# Patient Record
Sex: MALE | Race: WHITE | NOT HISPANIC OR LATINO | URBAN - METROPOLITAN AREA
[De-identification: names, ages, dates, MRNs, and addresses within clinical notes are randomized per-mention and may not be internally consistent; named-entity substitution may affect disease eponyms.]

---

## 2018-08-29 ENCOUNTER — OFFICE (OUTPATIENT)
Dept: URBAN - METROPOLITAN AREA CLINIC 19 | Facility: CLINIC | Age: 58
End: 2018-08-29

## 2018-08-29 VITALS
DIASTOLIC BLOOD PRESSURE: 72 MMHG | HEIGHT: 73 IN | HEART RATE: 67 BPM | SYSTOLIC BLOOD PRESSURE: 132 MMHG | WEIGHT: 213 LBS

## 2018-08-29 DIAGNOSIS — R15.2 FECAL URGENCY: ICD-10-CM

## 2018-08-29 DIAGNOSIS — M79.7 FIBROMYALGIA: ICD-10-CM

## 2018-08-29 DIAGNOSIS — K58.9 IRRITABLE BOWEL SYNDROME WITHOUT DIARRHEA: ICD-10-CM

## 2018-08-29 DIAGNOSIS — K21.9 GASTRO-ESOPHAGEAL REFLUX DISEASE WITHOUT ESOPHAGITIS: ICD-10-CM

## 2018-08-29 LAB — TSH: 1.08 UIU/ML (ref 0.45–4.5)

## 2018-08-29 PROCEDURE — 99214 OFFICE O/P EST MOD 30 MIN: CPT | Performed by: INTERNAL MEDICINE

## 2018-08-29 RX ORDER — DEXLANSOPRAZOLE 60 MG/1
60 CAPSULE, DELAYED RELEASE ORAL
Qty: 30 | Refills: 3 | Status: COMPLETED
End: 2018-08-29

## 2018-08-29 RX ORDER — PANTOPRAZOLE SODIUM 40 MG/1
40 TABLET, DELAYED RELEASE ORAL
Qty: 30 | Refills: 11 | Status: COMPLETED
Start: 2018-08-29 | End: 2018-02-12

## 2018-11-14 ENCOUNTER — OFFICE (OUTPATIENT)
Dept: URBAN - METROPOLITAN AREA CLINIC 19 | Facility: CLINIC | Age: 58
End: 2018-11-14

## 2018-11-14 VITALS
HEART RATE: 66 BPM | SYSTOLIC BLOOD PRESSURE: 141 MMHG | HEIGHT: 73 IN | WEIGHT: 211 LBS | DIASTOLIC BLOOD PRESSURE: 78 MMHG

## 2018-11-14 DIAGNOSIS — R15.2 FECAL URGENCY: ICD-10-CM

## 2018-11-14 DIAGNOSIS — K21.9 GASTRO-ESOPHAGEAL REFLUX DISEASE WITHOUT ESOPHAGITIS: ICD-10-CM

## 2018-11-14 DIAGNOSIS — M79.7 FIBROMYALGIA: ICD-10-CM

## 2018-11-14 DIAGNOSIS — K58.9 IRRITABLE BOWEL SYNDROME WITHOUT DIARRHEA: ICD-10-CM

## 2018-11-14 PROCEDURE — 99213 OFFICE O/P EST LOW 20 MIN: CPT | Performed by: INTERNAL MEDICINE

## 2019-05-15 ENCOUNTER — OFFICE (OUTPATIENT)
Dept: URBAN - METROPOLITAN AREA CLINIC 19 | Facility: CLINIC | Age: 59
End: 2019-05-15

## 2019-05-15 VITALS
SYSTOLIC BLOOD PRESSURE: 115 MMHG | WEIGHT: 200 LBS | HEART RATE: 59 BPM | DIASTOLIC BLOOD PRESSURE: 66 MMHG | HEIGHT: 73 IN

## 2019-05-15 DIAGNOSIS — K21.9 GASTRO-ESOPHAGEAL REFLUX DISEASE WITHOUT ESOPHAGITIS: ICD-10-CM

## 2019-05-15 DIAGNOSIS — K58.0 IRRITABLE BOWEL SYNDROME WITH DIARRHEA: ICD-10-CM

## 2019-05-15 DIAGNOSIS — R15.2 FECAL URGENCY: ICD-10-CM

## 2019-05-15 PROCEDURE — 99213 OFFICE O/P EST LOW 20 MIN: CPT | Performed by: INTERNAL MEDICINE

## 2019-05-15 RX ORDER — RIFAXIMIN 550 MG/1
TABLET ORAL
Qty: 42 | Refills: 0 | Status: COMPLETED
Start: 2019-05-15 | End: 2019-10-30

## 2019-10-30 ENCOUNTER — OFFICE (OUTPATIENT)
Dept: URBAN - METROPOLITAN AREA CLINIC 19 | Facility: CLINIC | Age: 59
End: 2019-10-30

## 2019-10-30 VITALS
DIASTOLIC BLOOD PRESSURE: 78 MMHG | HEART RATE: 73 BPM | WEIGHT: 194 LBS | SYSTOLIC BLOOD PRESSURE: 128 MMHG | HEIGHT: 73 IN

## 2019-10-30 DIAGNOSIS — R10.12 LEFT UPPER QUADRANT PAIN: ICD-10-CM

## 2019-10-30 DIAGNOSIS — K21.9 GASTRO-ESOPHAGEAL REFLUX DISEASE WITHOUT ESOPHAGITIS: ICD-10-CM

## 2019-10-30 PROCEDURE — 99214 OFFICE O/P EST MOD 30 MIN: CPT | Performed by: INTERNAL MEDICINE

## 2023-03-08 ENCOUNTER — OFFICE (OUTPATIENT)
Dept: URBAN - METROPOLITAN AREA CLINIC 19 | Facility: CLINIC | Age: 63
End: 2023-03-08

## 2023-03-08 VITALS
OXYGEN SATURATION: 99 % | SYSTOLIC BLOOD PRESSURE: 124 MMHG | HEART RATE: 59 BPM | DIASTOLIC BLOOD PRESSURE: 74 MMHG | HEIGHT: 73 IN | WEIGHT: 185 LBS

## 2023-03-08 DIAGNOSIS — K21.9 GASTRO-ESOPHAGEAL REFLUX DISEASE WITHOUT ESOPHAGITIS: ICD-10-CM

## 2023-03-08 DIAGNOSIS — K58.0 IRRITABLE BOWEL SYNDROME WITH DIARRHEA: ICD-10-CM

## 2023-03-08 PROCEDURE — 99214 OFFICE O/P EST MOD 30 MIN: CPT | Performed by: INTERNAL MEDICINE

## 2023-03-08 RX ORDER — FAMOTIDINE 40 MG/1
40 TABLET, FILM COATED ORAL
Qty: 30 | Refills: 11 | Status: COMPLETED
Start: 2023-03-08 | End: 2024-01-08

## 2023-11-07 ENCOUNTER — OFFICE (OUTPATIENT)
Dept: URBAN - METROPOLITAN AREA CLINIC 19 | Facility: CLINIC | Age: 63
End: 2023-11-07

## 2023-11-07 VITALS
HEART RATE: 63 BPM | SYSTOLIC BLOOD PRESSURE: 116 MMHG | DIASTOLIC BLOOD PRESSURE: 63 MMHG | OXYGEN SATURATION: 98 % | HEIGHT: 73 IN | WEIGHT: 185 LBS

## 2023-11-07 DIAGNOSIS — M79.7 FIBROMYALGIA: ICD-10-CM

## 2023-11-07 DIAGNOSIS — K21.9 GASTRO-ESOPHAGEAL REFLUX DISEASE WITHOUT ESOPHAGITIS: ICD-10-CM

## 2023-11-07 DIAGNOSIS — R19.7 DIARRHEA, UNSPECIFIED: ICD-10-CM

## 2023-11-07 PROCEDURE — 99214 OFFICE O/P EST MOD 30 MIN: CPT | Performed by: NURSE PRACTITIONER

## 2023-11-07 RX ORDER — BENZOYL PEROXIDE 135 MG/G
GEL TOPICAL
Qty: 60 | Refills: 11 | Status: ACTIVE
Start: 2023-11-07

## 2023-11-07 NOTE — SERVICENOTES
Discussed possible IBS-D. . Recent labs ok in August 2023 including CMP + TSH. Sent to Dr. Madedn for review. Dalia does not have diarrhea listed as possible side effect.

## 2023-11-07 NOTE — SERVICEHPINOTES
PREVIOUS HISTORY BY DR. MITTAL:marsha larson Mr. Burroughs is a 62 year old male with fibromyalgia and initially established for screening colonoscopy in 2016. He is a  for [employer]. More recently managed for IBS,  reflux and abdominal cramping. The workup including an IBcause test was normal.  symptoms actually seemed to improve with PPI therapy  and knowledge of low FODMAP  dietUPDATE 3/8/23 BY DR. MITTAL:br1st visit in years.  Overall doing okay.   IBS seems well controlled compared to in the past.  He does have some episodic abdominal spasms at night.brHe is trying to stop meds that can cause memory loss (short term). He inquires about pantoprazolebrHe gets some "throat spasm" at night, takes Pepcid for relief 
br
br   Recommendations included stopping pantoprazole, starting famotidine, GERD lifestyle modifications, f/u in one year.
br
br
UPDATE 11/7/23 BY JUDITH SETHI:
br
br
Mr. Burroughs presents to clinic today to discuss his reflux and diarrhea.   
br
br States he was on a PPI in the past (as above) which worked great for him but with his short-term memory loss he is fearful to take proton pump inhibitor.  He has been taking 40 mg of famotidine morning and evening but now is having breakthrough heartburn waking him up at night. 
br
marsha He also has diarrhea which did occur with metformin (Aug 2023) but he is off the metformin now and on Januvia.  States intermittent in duration, sometimes 6-7 times per day with cramping that is relieved with a bowel movement.  He had recent labs in August of 2023 which revealed a normal CBC, TSH, CMP except mildly elevated glucose.
br
brNo melena, hematochezia, hematemesis, coffee-ground emesis, anorexia, weight loss, dysphagia, odynophagia, constipation.  No well water at home.  No travel recently or antibiotics.  No NSAID use.

## 2023-11-08 LAB
CELIAC AB TTG DGP TIGA: DEAMIDATED GLIADIN ABS, IGA: 5 UNITS (ref 0–19)
CELIAC AB TTG DGP TIGA: DEAMIDATED GLIADIN ABS, IGG: 3 UNITS (ref 0–19)
CELIAC AB TTG DGP TIGA: IMMUNOGLOBULIN A, QN, SERUM: 219 MG/DL (ref 61–437)
CELIAC AB TTG DGP TIGA: T-TRANSGLUTAMINASE (TTG) IGA: <2 U/ML
CELIAC AB TTG DGP TIGA: T-TRANSGLUTAMINASE (TTG) IGG: <2 U/ML

## 2023-11-17 LAB

## 2024-01-08 ENCOUNTER — OFFICE (OUTPATIENT)
Dept: URBAN - METROPOLITAN AREA CLINIC 19 | Facility: CLINIC | Age: 64
End: 2024-01-08

## 2024-01-08 VITALS
HEIGHT: 73 IN | SYSTOLIC BLOOD PRESSURE: 124 MMHG | OXYGEN SATURATION: 99 % | HEART RATE: 59 BPM | WEIGHT: 189 LBS | DIASTOLIC BLOOD PRESSURE: 66 MMHG

## 2024-01-08 DIAGNOSIS — R19.7 DIARRHEA, UNSPECIFIED: ICD-10-CM

## 2024-01-08 DIAGNOSIS — K21.9 GASTRO-ESOPHAGEAL REFLUX DISEASE WITHOUT ESOPHAGITIS: ICD-10-CM

## 2024-01-08 DIAGNOSIS — R14.0 ABDOMINAL DISTENSION (GASEOUS): ICD-10-CM

## 2024-01-08 DIAGNOSIS — E73.9 LACTOSE INTOLERANCE, UNSPECIFIED: ICD-10-CM

## 2024-01-08 DIAGNOSIS — R19.5 OTHER FECAL ABNORMALITIES: ICD-10-CM

## 2024-01-08 PROCEDURE — 99214 OFFICE O/P EST MOD 30 MIN: CPT | Performed by: NURSE PRACTITIONER

## 2024-01-08 NOTE — SERVICEHPINOTES
PREVIOUS HISTORY BY DR. MADDEN:Mr. Burroughs is a 62 year old male with fibromyalgia and initially established for screening colonoscopy in 2016. He is a  for [employer]. More recently managed for IBS,  reflux and abdominal cramping. The workup including an IBcause test was normal.  symptoms actually seemed to improve with PPI therapy  and knowledge of low FODMAP  dietUPDATE 3/8/23 BY DR. MADDEN:br1st visit in years.  Overall doing okay.   IBS seems well controlled compared to in the past.  He does have some episodic abdominal spasms at night.brHe is trying to stop meds that can cause memory loss (short term). He inquires about pantoprazolebrHe gets some "throat spasm" at night, takes Pepcid for reliefRecommendations included stopping pantoprazole, starting famotidine, GERD lifestyle modifications, f/u in one year.UPDATE 11/7/23 BY JUDITH SETHI:Mr. Burroughs presents to clinic today to discuss his reflux and diarrhea.   States he was on a PPI in the past (as above) which worked great for him but with his short-term memory loss he is fearful to take proton pump inhibitor.  He has been taking 40 mg of famotidine morning and evening but now is having breakthrough heartburn waking him up at night.He also has diarrhea which did occur with metformin (Aug 2023) but he is off the metformin now and on Januvia.  States intermittent in duration, sometimes 6-7 times per day with cramping that is relieved with a bowel movement.  He had recent labs in August of 2023 which revealed a normal CBC, TSH, CMP except mildly elevated glucose.No melena, hematochezia, hematemesis, coffee-ground emesis, anorexia, weight loss, dysphagia, odynophagia, constipation.  No well water at home.  No travel recently or antibiotics.  No NSAID use. 
br
br   Recommendations included cimetidine, celiac profile (neg), pancreatic elastase (low), stool PCR (neg), CRP (neg), GERD diet, recall colonoscopy 2026. Dr. Madden recommended Xifaxan for IBS-D and repeat fecal elastase in future.br 
br
UPDATE BY JUDITH SETHI 1/8/24:
br 
br
Mr. Burroughs presents to clinic today for follow-up. 
br
br
He is having persistent acid reflux symptoms.  He does not want to take a proton pump inhibitor because of his short-term memory issues.  He is taking cimetidine b.i.d. 200 mg.  Also taking Tums as needed.   He has reflux symptoms at night waking him up.  He has some intermittent epigastric discomfort at times.  He also has bloating, diarrhea does seem better and occasional abdominal cramping.  He does not want any dicyclomine or hyoscyamine.  Did complete his Xifaxan.
br
brNo melena, hematochezia, fever, unintentional weight loss, nausea / vomiting, dysphagia, odynophagia, rectal pain.  He does not take any NSAIDs.

## 2024-04-01 ENCOUNTER — AMBULATORY SURGICAL CENTER (OUTPATIENT)
Dept: URBAN - METROPOLITAN AREA SURGERY 2 | Facility: SURGERY | Age: 64
End: 2024-04-01

## 2024-04-01 ENCOUNTER — OFFICE (OUTPATIENT)
Dept: URBAN - METROPOLITAN AREA PATHOLOGY 12 | Facility: PATHOLOGY | Age: 64
End: 2024-04-01

## 2024-04-01 VITALS
HEART RATE: 57 BPM | HEART RATE: 60 BPM | RESPIRATION RATE: 16 BRPM | RESPIRATION RATE: 18 BRPM | SYSTOLIC BLOOD PRESSURE: 133 MMHG | HEART RATE: 60 BPM | OXYGEN SATURATION: 92 % | WEIGHT: 185 LBS | HEART RATE: 60 BPM | SYSTOLIC BLOOD PRESSURE: 133 MMHG | HEART RATE: 57 BPM | HEART RATE: 59 BPM | SYSTOLIC BLOOD PRESSURE: 131 MMHG | OXYGEN SATURATION: 94 % | OXYGEN SATURATION: 95 % | HEART RATE: 57 BPM | OXYGEN SATURATION: 93 % | HEART RATE: 63 BPM | SYSTOLIC BLOOD PRESSURE: 133 MMHG | HEIGHT: 73 IN | OXYGEN SATURATION: 95 % | SYSTOLIC BLOOD PRESSURE: 142 MMHG | RESPIRATION RATE: 16 BRPM | DIASTOLIC BLOOD PRESSURE: 74 MMHG | HEIGHT: 73 IN | DIASTOLIC BLOOD PRESSURE: 78 MMHG | SYSTOLIC BLOOD PRESSURE: 142 MMHG | DIASTOLIC BLOOD PRESSURE: 78 MMHG | SYSTOLIC BLOOD PRESSURE: 94 MMHG | HEART RATE: 59 BPM | TEMPERATURE: 97 F | HEART RATE: 64 BPM | WEIGHT: 185 LBS | DIASTOLIC BLOOD PRESSURE: 49 MMHG | OXYGEN SATURATION: 94 % | RESPIRATION RATE: 15 BRPM | TEMPERATURE: 97 F | HEART RATE: 59 BPM | OXYGEN SATURATION: 94 % | SYSTOLIC BLOOD PRESSURE: 94 MMHG | HEART RATE: 63 BPM | SYSTOLIC BLOOD PRESSURE: 131 MMHG | DIASTOLIC BLOOD PRESSURE: 74 MMHG | SYSTOLIC BLOOD PRESSURE: 94 MMHG | HEART RATE: 63 BPM | DIASTOLIC BLOOD PRESSURE: 74 MMHG | HEIGHT: 73 IN | DIASTOLIC BLOOD PRESSURE: 49 MMHG | RESPIRATION RATE: 15 BRPM | SYSTOLIC BLOOD PRESSURE: 100 MMHG | SYSTOLIC BLOOD PRESSURE: 131 MMHG | RESPIRATION RATE: 18 BRPM | HEART RATE: 64 BPM | OXYGEN SATURATION: 92 % | HEART RATE: 64 BPM | SYSTOLIC BLOOD PRESSURE: 100 MMHG | OXYGEN SATURATION: 92 % | TEMPERATURE: 97 F | SYSTOLIC BLOOD PRESSURE: 100 MMHG | RESPIRATION RATE: 18 BRPM | DIASTOLIC BLOOD PRESSURE: 90 MMHG | DIASTOLIC BLOOD PRESSURE: 90 MMHG | OXYGEN SATURATION: 95 % | OXYGEN SATURATION: 93 % | SYSTOLIC BLOOD PRESSURE: 142 MMHG | DIASTOLIC BLOOD PRESSURE: 90 MMHG | DIASTOLIC BLOOD PRESSURE: 78 MMHG | RESPIRATION RATE: 15 BRPM | RESPIRATION RATE: 16 BRPM | OXYGEN SATURATION: 93 % | DIASTOLIC BLOOD PRESSURE: 49 MMHG | WEIGHT: 185 LBS

## 2024-04-01 DIAGNOSIS — K22.89 OTHER SPECIFIED DISEASE OF ESOPHAGUS: ICD-10-CM

## 2024-04-01 DIAGNOSIS — K21.00 GASTRO-ESOPHAGEAL REFLUX DISEASE WITH ESOPHAGITIS, WITHOUT B: ICD-10-CM

## 2024-04-01 DIAGNOSIS — K31.89 OTHER DISEASES OF STOMACH AND DUODENUM: ICD-10-CM

## 2024-04-01 PROBLEM — K20.90 ESOPHAGITIS, UNSPECIFIED WITHOUT BLEEDING: Status: ACTIVE | Noted: 2024-04-01

## 2024-04-01 PROBLEM — K20.80 OTHER ESOPHAGITIS WITHOUT BLEEDING: Status: ACTIVE | Noted: 2024-04-01

## 2024-04-01 PROCEDURE — 43239 EGD BIOPSY SINGLE/MULTIPLE: CPT | Performed by: INTERNAL MEDICINE

## 2024-04-01 PROCEDURE — 88305 TISSUE EXAM BY PATHOLOGIST: CPT | Performed by: PATHOLOGY

## 2024-04-01 RX ORDER — SUCRALFATE 1 G/1
TABLET ORAL
Qty: 90 | Refills: -1 | Status: ACTIVE
Start: 2024-04-01

## 2024-04-01 RX ORDER — VONOPRAZAN FUMARATE 26.72 MG/1
20 TABLET ORAL
Qty: 30 | Refills: 11 | Status: ACTIVE
Start: 2024-04-01

## 2024-04-01 RX ORDER — PANTOPRAZOLE SODIUM 40 MG/1
40 TABLET, DELAYED RELEASE ORAL
Qty: 30 | Refills: 11 | Status: ACTIVE
Start: 2024-04-01

## 2024-04-01 RX ADMIN — PROPOFOL 160 MG: 10 INJECTION, EMULSION INTRAVENOUS at 08:17

## 2024-04-02 LAB
GASTRO ONE PATHOLOGY: PDF REPORT: (no result)

## 2024-06-04 ENCOUNTER — OFFICE (OUTPATIENT)
Dept: URBAN - METROPOLITAN AREA CLINIC 19 | Facility: CLINIC | Age: 64
End: 2024-06-04

## 2024-06-04 VITALS
WEIGHT: 188 LBS | HEIGHT: 73 IN | SYSTOLIC BLOOD PRESSURE: 123 MMHG | DIASTOLIC BLOOD PRESSURE: 79 MMHG | HEART RATE: 58 BPM | OXYGEN SATURATION: 98 %

## 2024-06-04 DIAGNOSIS — R19.7 DIARRHEA, UNSPECIFIED: ICD-10-CM

## 2024-06-04 DIAGNOSIS — K21.9 GASTRO-ESOPHAGEAL REFLUX DISEASE WITHOUT ESOPHAGITIS: ICD-10-CM

## 2024-06-04 DIAGNOSIS — R41.3 OTHER AMNESIA: ICD-10-CM

## 2024-06-04 DIAGNOSIS — K58.0 IRRITABLE BOWEL SYNDROME WITH DIARRHEA: ICD-10-CM

## 2024-06-04 DIAGNOSIS — M79.7 FIBROMYALGIA: ICD-10-CM

## 2024-06-04 DIAGNOSIS — K20.80 OTHER ESOPHAGITIS WITHOUT BLEEDING: ICD-10-CM

## 2024-06-04 DIAGNOSIS — K22.10 ULCER OF ESOPHAGUS WITHOUT BLEEDING: ICD-10-CM

## 2024-06-04 PROCEDURE — 99214 OFFICE O/P EST MOD 30 MIN: CPT | Performed by: NURSE PRACTITIONER

## 2024-06-04 RX ORDER — DICYCLOMINE HYDROCHLORIDE 10 MG/1
CAPSULE ORAL
Qty: 30 | Refills: 3 | Status: ACTIVE
Start: 2024-06-04

## 2024-06-04 RX ORDER — RIFAXIMIN 550 MG/1
TABLET ORAL
Qty: 42 | Refills: 0 | Status: ACTIVE
Start: 2024-06-04

## 2024-06-04 NOTE — SERVICEHPINOTES
PREVIOUS HISTORY BY DR. MADDEN:Mr. Burroughs is a 62 year old male with fibromyalgia and initially established for screening colonoscopy in 2016. He is a  for [employer]. More recently managed for IBS,  reflux and abdominal cramping. The workup including an IBcause test was normal.  symptoms actually seemed to improve with PPI therapy  and knowledge of low FODMAP  dietUPDATE 3/8/23 BY DR. MADDEN:br1st visit in years.  Overall doing okay.   IBS seems well controlled compared to in the past.  He does have some episodic abdominal spasms at night.brJuan Daniel is trying to stop meds that can cause memory loss (short term). He inquires about pantoprazolebrHe gets some "throat spasm" at night, takes Pepcid for reliefRecommendations included stopping pantoprazole, starting famotidine, GERD lifestyle modifications, f/u in one year.UPDATE 11/7/23 BY JUDITH SETHI:Mr. Burroughs presents to clinic today to discuss his reflux and diarrhea.   States he was on a PPI in the past (as above) which worked great for him but with his short-term memory loss he is fearful to take proton pump inhibitor.  He has been taking 40 mg of famotidine morning and evening but now is having breakthrough heartburn waking him up at night.He also has diarrhea which did occur with metformin (Aug 2023) but he is off the metformin now and on Januvia.  States intermittent in duration, sometimes 6-7 times per day with cramping that is relieved with a bowel movement.  He had recent labs in August of 2023 which revealed a normal CBC, TSH, CMP except mildly elevated glucose.No melena, hematochezia, hematemesis, coffee-ground emesis, anorexia, weight loss, dysphagia, odynophagia, constipation.  No well water at home.  No travel recently or antibiotics.  No NSAID use.Recommendations included cimetidine, celiac profile (neg), pancreatic elastase (low), stool PCR (neg), CRP (neg), GERD diet, recall colonoscopy 2026. Dr. Madden recommended Xifaxan for IBS-D and repeat fecal elastase in future.UPDATE BY JUDITH SETHI 1/8/24:Mr. Burroughs presents to clinic today for follow-up. He is having persistent acid reflux symptoms.  He does not want to take a proton pump inhibitor because of his short-term memory issues.  He is taking cimetidine b.i.d. 200 mg.  Also taking Tums as needed.   He has reflux symptoms at night waking him up.  He has some intermittent epigastric discomfort at times.  He also has bloating, diarrhea does seem better and occasional abdominal cramping.  He does not want any dicyclomine or hyoscyamine.  Did complete his Xifaxan.No melena, hematochezia, fever, unintentional weight loss, nausea / vomiting, dysphagia, odynophagia, rectal pain.  He does not take any NSAIDs. 
br
br   Recommendations included recall colonoscopy 2026, repeat fecal elastase in 3 months and hold on imaging, GERD diet, cimetidine BID, EGD.
br
br
EGD BY DR. MADDEN 4/1/24:
brImpressionsbr- Congestion, granularity and heaped-up margins (IN VICINITY OF THE ULCERATIVE ESOPHAGITIS) in the lower third of the esophagus. (Biopsy)br- Normal stomachbr- Normal duodenumbr- Grade C (WITH ULCERS) esophagitis in the lower third of the esophagus
br
PATH: NEG
br
br * He had a CT abdomen pancreatic protocol on 05/08/2024 that revealed no pancreatic abnormality, simple right renal cyst.br
br
UPDATE BY JUDITH SETHI 6/4/24:
br
br
Mr. Burroughs presents to clinic today for follow-up EGD, continued GI symptoms. He is taking Creon (2 with breakfast, dinner and 1 at lunch).  He has not seen any improvement in his symptoms and still has abdominal cramping with diarrhea.  It actually seems maybe worse.  Having 1-2 loose liquidy stools per day.  He is on his pantoprazole once daily in the morning which has eradicated all reflux symptoms however he continues to have short-term memory changes.  He has seen a neurologist in the past and I encouraged him to see this physician again to discuss.  He has fatigue in the setting of fibromyalgia.  Recent physical CBC and CMP which was unrevealing.
br
brNo melena, hematochezia, hematemesis, coffee-ground emesis, dysphagia, odynophagia, unintentional weight loss, early satiety, rectal pain, constipation.

## 2024-06-04 NOTE — SERVICENOTES
If your symptoms do not improve with IBS-D recommendations, will discuss colonoscopy. Sent to Dr. Madden for review.

## 2024-07-29 ENCOUNTER — AMBULATORY SURGICAL CENTER (OUTPATIENT)
Dept: URBAN - METROPOLITAN AREA SURGERY 3 | Facility: SURGERY | Age: 64
End: 2024-07-29

## 2024-07-29 ENCOUNTER — OFFICE (OUTPATIENT)
Dept: URBAN - METROPOLITAN AREA PATHOLOGY 12 | Facility: PATHOLOGY | Age: 64
End: 2024-07-29

## 2024-07-29 VITALS
OXYGEN SATURATION: 99 % | TEMPERATURE: 97.8 F | SYSTOLIC BLOOD PRESSURE: 124 MMHG | OXYGEN SATURATION: 98 % | OXYGEN SATURATION: 99 % | SYSTOLIC BLOOD PRESSURE: 114 MMHG | DIASTOLIC BLOOD PRESSURE: 74 MMHG | WEIGHT: 185 LBS | HEART RATE: 59 BPM | SYSTOLIC BLOOD PRESSURE: 124 MMHG | HEART RATE: 58 BPM | HEART RATE: 59 BPM | DIASTOLIC BLOOD PRESSURE: 59 MMHG | RESPIRATION RATE: 20 BRPM | TEMPERATURE: 97.8 F | DIASTOLIC BLOOD PRESSURE: 58 MMHG | WEIGHT: 185 LBS | RESPIRATION RATE: 20 BRPM | HEART RATE: 53 BPM | SYSTOLIC BLOOD PRESSURE: 128 MMHG | SYSTOLIC BLOOD PRESSURE: 108 MMHG | DIASTOLIC BLOOD PRESSURE: 69 MMHG | OXYGEN SATURATION: 98 % | SYSTOLIC BLOOD PRESSURE: 128 MMHG | RESPIRATION RATE: 15 BRPM | SYSTOLIC BLOOD PRESSURE: 111 MMHG | TEMPERATURE: 98.1 F | DIASTOLIC BLOOD PRESSURE: 74 MMHG | DIASTOLIC BLOOD PRESSURE: 58 MMHG | RESPIRATION RATE: 15 BRPM | DIASTOLIC BLOOD PRESSURE: 69 MMHG | HEART RATE: 55 BPM | DIASTOLIC BLOOD PRESSURE: 81 MMHG | OXYGEN SATURATION: 96 % | HEIGHT: 73 IN | SYSTOLIC BLOOD PRESSURE: 114 MMHG | DIASTOLIC BLOOD PRESSURE: 59 MMHG | TEMPERATURE: 98.1 F | SYSTOLIC BLOOD PRESSURE: 108 MMHG | HEIGHT: 73 IN | OXYGEN SATURATION: 96 % | DIASTOLIC BLOOD PRESSURE: 81 MMHG | HEART RATE: 58 BPM | HEART RATE: 53 BPM | SYSTOLIC BLOOD PRESSURE: 111 MMHG | HEART RATE: 55 BPM | RESPIRATION RATE: 18 BRPM | RESPIRATION RATE: 18 BRPM

## 2024-07-29 DIAGNOSIS — K64.0 FIRST DEGREE HEMORRHOIDS: ICD-10-CM

## 2024-07-29 DIAGNOSIS — R19.7 DIARRHEA, UNSPECIFIED: ICD-10-CM

## 2024-07-29 DIAGNOSIS — K63.89 OTHER SPECIFIED DISEASES OF INTESTINE: ICD-10-CM

## 2024-07-29 DIAGNOSIS — K63.5 POLYP OF COLON: ICD-10-CM

## 2024-07-29 PROCEDURE — 88305 TISSUE EXAM BY PATHOLOGIST: CPT | Performed by: PATHOLOGY

## 2024-07-29 PROCEDURE — 45380 COLONOSCOPY AND BIOPSY: CPT | Performed by: INTERNAL MEDICINE

## 2024-07-31 LAB
GASTRO ONE PATHOLOGY: PDF REPORT: (no result)
GASTRO ONE PATHOLOGY: PDF REPORT: (no result)

## 2025-05-12 ENCOUNTER — OFFICE (OUTPATIENT)
Dept: URBAN - METROPOLITAN AREA CLINIC 19 | Facility: CLINIC | Age: 65
End: 2025-05-12
Payer: COMMERCIAL

## 2025-05-12 VITALS
DIASTOLIC BLOOD PRESSURE: 81 MMHG | WEIGHT: 184 LBS | DIASTOLIC BLOOD PRESSURE: 78 MMHG | SYSTOLIC BLOOD PRESSURE: 134 MMHG | HEIGHT: 73 IN | SYSTOLIC BLOOD PRESSURE: 140 MMHG | OXYGEN SATURATION: 98 % | HEART RATE: 71 BPM

## 2025-05-12 DIAGNOSIS — K58.0 IRRITABLE BOWEL SYNDROME WITH DIARRHEA: ICD-10-CM

## 2025-05-12 DIAGNOSIS — M79.7 FIBROMYALGIA: ICD-10-CM

## 2025-05-12 DIAGNOSIS — K21.9 GASTRO-ESOPHAGEAL REFLUX DISEASE WITHOUT ESOPHAGITIS: ICD-10-CM

## 2025-05-12 DIAGNOSIS — R41.3 OTHER AMNESIA: ICD-10-CM

## 2025-05-12 DIAGNOSIS — R14.0 ABDOMINAL DISTENSION (GASEOUS): ICD-10-CM

## 2025-05-12 PROCEDURE — 99214 OFFICE O/P EST MOD 30 MIN: CPT | Performed by: NURSE PRACTITIONER

## 2025-05-12 RX ORDER — RIFAXIMIN 550 MG/1
TABLET ORAL
Qty: 42 | Refills: 0 | Status: ACTIVE
Start: 2025-05-12

## 2025-05-12 RX ORDER — DICYCLOMINE HYDROCHLORIDE 10 MG/1
CAPSULE ORAL
Qty: 30 | Refills: 3 | Status: ACTIVE
Start: 2025-05-12

## 2025-05-12 RX ORDER — ESOMEPRAZOLE MAGNESIUM 40 MG/1
CAPSULE, DELAYED RELEASE PELLETS ORAL
Qty: 30 | Refills: 5 | Status: ACTIVE
Start: 2025-05-12

## 2025-05-12 NOTE — SERVICEHPINOTES
65 year old  male with PMH including IBS-D, reflux, grade C esophagitis in 2024. 
marsha larson He presents to clinic today to discuss his GI symptoms. I saw him last in June 2024 for follow-up after his EGD revealing grade C esophagitis. We tried him on Creon (mildly low elastase) but this actually made him feel worse. He was complaining of continued short term memory loss and I had recommended he see his neurologist. His alpha gal was negative and I sent in dicyclomine and Xifaxan. He is still taking the pantoprazole once daily. He complains of bloating not all day but typically post meal. He states he loves to snack after dinner so he is trying not to eat after 7 or 8:00 p.m.. He did see a neurologist but this was for loss of vision and then was referred to an eye specialist and diagnosed with an optic migraine. He is having diarrhea about 3 times a week which is actually better for him. Typically to 3 BMs per day. He does have intermittent abdominal pain in the mid abdomen but he states he has relief after the bowel movement. Recently had a CBC and CMP that were normal. No melena, hematochezia, hematemesis, coffee-ground emesis, dysphagia, unintentional weight loss, anorexia, rectal pain, constipation.* He had a CT abdomen pancreatic protocol on 05/08/2024 that revealed no pancreatic abnormality, simple right renal cyst. He had a colonoscopy in July of 2024 that revealed a 3 mm polyp, small internal hemorrhoids otherwise normal. Pathology was negative. He has had previous workup including negative celiac, negative CT abdomen pelvis, etc. please see previous notes.  No family history of colon cancer colon polyps.marsha

## 2025-05-16 LAB
C-REACTIVE PROTEIN, QUANT: 2 MG/L (ref 0–10)
FOOD ALLERGY PROFILE: CLASS DESCRIPTION: (no result)
FOOD ALLERGY PROFILE: F001-IGE EGG WHITE: <0.1 KU/L
FOOD ALLERGY PROFILE: F002-IGE MILK: <0.1 KU/L
FOOD ALLERGY PROFILE: F003-IGE CODFISH: <0.1 KU/L
FOOD ALLERGY PROFILE: F004-IGE WHEAT: <0.1 KU/L
FOOD ALLERGY PROFILE: F008-IGE CORN: <0.1 KU/L
FOOD ALLERGY PROFILE: F010-IGE SESAME SEED: <0.1 KU/L
FOOD ALLERGY PROFILE: F013-IGE PEANUT: <0.1 KU/L
FOOD ALLERGY PROFILE: F014-IGE SOYBEAN: <0.1 KU/L
FOOD ALLERGY PROFILE: F024-IGE SHRIMP: <0.1 KU/L
FOOD ALLERGY PROFILE: F207-IGE CLAM: <0.1 KU/L
FOOD ALLERGY PROFILE: F256-IGE WALNUT: <0.1 KU/L
FOOD ALLERGY PROFILE: F338-IGE SCALLOP: <0.1 KU/L
TSH: 1.01 UIU/ML (ref 0.45–4.5)

## 2025-08-11 ENCOUNTER — OFFICE (OUTPATIENT)
Dept: URBAN - METROPOLITAN AREA CLINIC 19 | Facility: CLINIC | Age: 65
End: 2025-08-11
Payer: COMMERCIAL

## 2025-08-11 VITALS
WEIGHT: 186 LBS | HEIGHT: 73 IN | HEART RATE: 64 BPM | SYSTOLIC BLOOD PRESSURE: 134 MMHG | OXYGEN SATURATION: 97 % | DIASTOLIC BLOOD PRESSURE: 75 MMHG

## 2025-08-11 DIAGNOSIS — R41.3 OTHER AMNESIA: ICD-10-CM

## 2025-08-11 DIAGNOSIS — K58.0 IRRITABLE BOWEL SYNDROME WITH DIARRHEA: ICD-10-CM

## 2025-08-11 DIAGNOSIS — K21.9 GASTRO-ESOPHAGEAL REFLUX DISEASE WITHOUT ESOPHAGITIS: ICD-10-CM

## 2025-08-11 DIAGNOSIS — K20.80 OTHER ESOPHAGITIS WITHOUT BLEEDING: ICD-10-CM

## 2025-08-11 DIAGNOSIS — M79.7 FIBROMYALGIA: ICD-10-CM

## 2025-08-11 PROCEDURE — 99214 OFFICE O/P EST MOD 30 MIN: CPT | Performed by: NURSE PRACTITIONER

## 2025-08-11 RX ORDER — DICYCLOMINE HYDROCHLORIDE 10 MG/1
CAPSULE ORAL
Qty: 60 | Refills: 3 | Status: ACTIVE
Start: 2025-05-12

## 2025-08-11 RX ORDER — FAMOTIDINE 20 MG/1
20 TABLET, FILM COATED ORAL
Qty: 30 | Refills: 11 | Status: ACTIVE
Start: 2025-08-11

## 2025-08-11 RX ORDER — ESOMEPRAZOLE MAGNESIUM 40 MG/1
CAPSULE, DELAYED RELEASE PELLETS ORAL
Qty: 90 | Refills: 3 | Status: ACTIVE
Start: 2025-05-12